# Patient Record
Sex: FEMALE | Race: WHITE | NOT HISPANIC OR LATINO | Employment: FULL TIME | URBAN - METROPOLITAN AREA
[De-identification: names, ages, dates, MRNs, and addresses within clinical notes are randomized per-mention and may not be internally consistent; named-entity substitution may affect disease eponyms.]

---

## 2024-11-25 ENCOUNTER — OFFICE VISIT (OUTPATIENT)
Dept: URGENT CARE | Facility: CLINIC | Age: 44
End: 2024-11-25
Payer: COMMERCIAL

## 2024-11-25 VITALS
HEART RATE: 80 BPM | OXYGEN SATURATION: 99 % | SYSTOLIC BLOOD PRESSURE: 122 MMHG | TEMPERATURE: 98.9 F | DIASTOLIC BLOOD PRESSURE: 82 MMHG

## 2024-11-25 DIAGNOSIS — L03.317 CELLULITIS OF BUTTOCK: Primary | ICD-10-CM

## 2024-11-25 PROCEDURE — 99203 OFFICE O/P NEW LOW 30 MIN: CPT | Performed by: NURSE PRACTITIONER

## 2024-11-25 RX ORDER — LACTULOSE 10 G/15ML
SOLUTION ORAL
COMMUNITY
Start: 2024-02-07

## 2024-11-25 RX ORDER — LAMOTRIGINE 100 MG/1
100 TABLET ORAL
COMMUNITY

## 2024-11-25 RX ORDER — SULFAMETHOXAZOLE AND TRIMETHOPRIM 800; 160 MG/1; MG/1
1 TABLET ORAL 2 TIMES DAILY
Qty: 20 TABLET | Refills: 0 | Status: SHIPPED | OUTPATIENT
Start: 2024-11-25 | End: 2024-12-05

## 2024-11-25 RX ORDER — GUAIFENESIN, PSEUDOEPHEDRINE HYDROCHLORIDE 600; 60 MG/1; MG/1
TABLET, EXTENDED RELEASE ORAL EVERY 12 HOURS
COMMUNITY
Start: 2022-10-25

## 2024-11-25 RX ORDER — LUBIPROSTONE 24 UG/1
24 CAPSULE ORAL 2 TIMES DAILY
COMMUNITY

## 2024-11-25 RX ORDER — AMMONIUM LACTATE 12 G/100G
CREAM TOPICAL
COMMUNITY
Start: 2024-01-08

## 2024-11-25 RX ORDER — METHYLPREDNISOLONE 4 MG/1
TABLET ORAL
COMMUNITY
Start: 2022-10-25

## 2024-11-25 RX ORDER — GABAPENTIN 400 MG/1
400 CAPSULE ORAL 3 TIMES DAILY
COMMUNITY

## 2024-11-25 RX ORDER — NAPROXEN 500 MG/1
TABLET ORAL 2 TIMES DAILY
COMMUNITY
Start: 2021-02-23

## 2024-11-25 RX ORDER — TRIAMCINOLONE ACETONIDE 1 MG/G
OINTMENT TOPICAL
COMMUNITY
Start: 2024-02-23

## 2024-11-25 RX ORDER — OSELTAMIVIR PHOSPHATE 75 MG/1
CAPSULE ORAL 2 TIMES DAILY
COMMUNITY
Start: 2022-10-25

## 2024-11-25 RX ORDER — PAROXETINE 10 MG/1
10 TABLET, FILM COATED ORAL
COMMUNITY

## 2024-11-25 RX ORDER — SODIUM FLUORIDE1.1%, POTASSIUM NITRATE 5% 5.8; 57.5 MG/ML; MG/ML
GEL, DENTIFRICE DENTAL
COMMUNITY
Start: 2024-10-01

## 2024-11-25 RX ORDER — BUPRENORPHINE AND NALOXONE 8; 2 MG/1; MG/1
FILM, SOLUBLE BUCCAL; SUBLINGUAL
COMMUNITY

## 2024-11-25 RX ORDER — KETOCONAZOLE 20 MG/G
CREAM TOPICAL DAILY
COMMUNITY
Start: 2023-11-27

## 2024-11-25 RX ORDER — VALACYCLOVIR HYDROCHLORIDE 1 G/1
1 TABLET, FILM COATED ORAL
COMMUNITY
Start: 2023-12-05

## 2024-11-25 RX ORDER — CELECOXIB 100 MG/1
100 CAPSULE ORAL EVERY 12 HOURS
COMMUNITY
Start: 2024-09-23

## 2024-11-25 NOTE — PROGRESS NOTES
Subjective   Patient ID: Jessica Álvarez is a 43 y.o. female.    Patient presents with rash to buttock region, rash on one side of body, reports a burning / tingling discomfort in area, clusters, denies discharge, Hx of shingles/MRSA.  She states that she was had a rash in the same similar area they thought it was shingles and did a swallow but then they put her in the hospital on IV antibiotic therapy.  He did improve.  The rapidly was a good diagnosis.  The rash that does not hurt in any way that is white pimple-like structure noted to the lower buttocks.  Denies any fever, chills, chest pain or shortness of breath.  No treatment prior to arrival      History provided by:  Patient  Rash        The following portions of the chart were reviewed this encounter and updated as appropriate:  Allergies  Meds  Problems  Med Hx  Surg Hx  Fam Hx         Review of Systems   Skin:  Positive for rash.   All other systems reviewed and are negative.    Objective   Physical Exam  Vitals and nursing note reviewed.   Constitutional:       General: She is not in acute distress.     Appearance: Normal appearance. She is not toxic-appearing.   HENT:      Head: Normocephalic.      Right Ear: External ear normal.      Left Ear: External ear normal.      Nose: Nose normal.      Mouth/Throat:      Mouth: Mucous membranes are moist.      Pharynx: No oropharyngeal exudate or posterior oropharyngeal erythema.   Eyes:      Extraocular Movements: Extraocular movements intact.   Cardiovascular:      Rate and Rhythm: Normal rate and regular rhythm.      Heart sounds: Normal heart sounds.   Pulmonary:      Effort: Pulmonary effort is normal.      Breath sounds: Normal breath sounds. No wheezing.   Musculoskeletal:         General: Normal range of motion.      Cervical back: Normal range of motion and neck supple.   Skin:     Capillary Refill: Capillary refill takes less than 2 seconds.      Comments: [employer] medical assistant has been most of  her in room at all times.  There was an area of erythema with 5 white pustule like structures noted to the right lower gluteal fold.  No pain, no drainage.  No lymphangitic streaking   Neurological:      General: No focal deficit present.      Mental Status: She is alert and oriented to person, place, and time.   Psychiatric:         Mood and Affect: Mood normal.         Behavior: Behavior normal.         Thought Content: Thought content normal.       Procedures    Assessment/Plan   Diagnoses and all orders for this visit:  Cellulitis of buttock  -     sulfamethoxazole-trimethoprim (Bactrim DS) 800-160 mg tablet; Take 1 tablet by mouth 2 times a day for 10 days.  Treat for MRSA cellulitis  I will admit the rash does look like shingles but multiple times she stated that she does not have any pain and had no pain on palpation.  I believe with the nature of the appearance of this being shingles would be very painful.  Patient will start antibiotic therapy there is no improvement she can call back to be placed on Valtrex  Patient agrees and all Questions were answered    Above plan of care was reviewed with the patient, all questions were answered, through shared decision making the patient agrees with this plan of care.    Red flags for strict return precaution have been reviewed with the patient and or patient tonidian, patient is alert, oriented and non-toxic appearing, has decision making capabilities and agrees with the plan of care through shared decision making at this time. Current diagnosis, any medication changes, lab or radiologic results have been reviewed with the patient at the time of visit. If symptoms do not improve, or worsen, patient is to follow up with PCP or report to the emergency room.   Patient is alert and oriented x3 vital signs stable nontoxic-appearing and has decision-making capabilities.    Please note that the majority this note was made with Dragon speaking software there may be  grammatical errors secondary to the speaking program.      Patient disposition: Home

## 2025-07-17 ENCOUNTER — OFFICE VISIT (OUTPATIENT)
Facility: CLINIC | Age: 45
End: 2025-07-17
Payer: COMMERCIAL

## 2025-07-17 VITALS
DIASTOLIC BLOOD PRESSURE: 86 MMHG | OXYGEN SATURATION: 96 % | SYSTOLIC BLOOD PRESSURE: 119 MMHG | RESPIRATION RATE: 16 BRPM | HEART RATE: 72 BPM | TEMPERATURE: 97.8 F | WEIGHT: 131 LBS | BODY MASS INDEX: 19.92 KG/M2

## 2025-07-17 DIAGNOSIS — H66.001 NON-RECURRENT ACUTE SUPPURATIVE OTITIS MEDIA OF RIGHT EAR WITHOUT SPONTANEOUS RUPTURE OF TYMPANIC MEMBRANE: Primary | ICD-10-CM

## 2025-07-17 DIAGNOSIS — T36.95XA ANTIBIOTIC-INDUCED YEAST INFECTION: ICD-10-CM

## 2025-07-17 DIAGNOSIS — J01.10 ACUTE NON-RECURRENT FRONTAL SINUSITIS: ICD-10-CM

## 2025-07-17 DIAGNOSIS — B37.9 ANTIBIOTIC-INDUCED YEAST INFECTION: ICD-10-CM

## 2025-07-17 PROCEDURE — 99213 OFFICE O/P EST LOW 20 MIN: CPT | Performed by: PHYSICIAN ASSISTANT

## 2025-07-17 RX ORDER — AMOXICILLIN AND CLAVULANATE POTASSIUM 875; 125 MG/1; MG/1
875 TABLET, FILM COATED ORAL 2 TIMES DAILY
Qty: 20 TABLET | Refills: 0 | Status: SHIPPED | OUTPATIENT
Start: 2025-07-17 | End: 2025-07-27

## 2025-07-17 RX ORDER — PAROXETINE 20 MG/1
TABLET, FILM COATED ORAL
COMMUNITY

## 2025-07-17 RX ORDER — OMEPRAZOLE 40 MG/1
40 CAPSULE, DELAYED RELEASE ORAL
COMMUNITY

## 2025-07-17 RX ORDER — METHYLPREDNISOLONE 4 MG/1
TABLET ORAL
Qty: 21 TABLET | Refills: 0 | Status: SHIPPED | OUTPATIENT
Start: 2025-07-17

## 2025-07-17 RX ORDER — GABAPENTIN 800 MG/1
1 TABLET ORAL
COMMUNITY
Start: 2025-06-26

## 2025-07-17 RX ORDER — FLUCONAZOLE 150 MG/1
TABLET ORAL
Qty: 2 TABLET | Refills: 0 | Status: SHIPPED | OUTPATIENT
Start: 2025-07-17

## 2025-07-17 RX ORDER — OXAPROZIN 600 MG/1
TABLET, FILM COATED ORAL DAILY
COMMUNITY

## 2025-07-17 RX ORDER — FLUTICASONE PROPIONATE 50 MCG
1 SPRAY, SUSPENSION (ML) NASAL
COMMUNITY
Start: 2025-03-17 | End: 2025-07-17 | Stop reason: WASHOUT

## 2025-07-17 RX ORDER — LAMOTRIGINE 150 MG/1
1 TABLET ORAL DAILY
COMMUNITY

## 2025-07-17 NOTE — PROGRESS NOTES
"Subjective   Patient ID: Jessica Álvarez is a 44 y.o. female who presents for sinus congestion and Cough.    HPI     Pt c/o sinus congestion and productive cough x 5 days. States that her chest \"sounds like it's full of something.\" Does have slight body aches. She is taking pseudoephedrine, Tylenol, and Mucinex with little improvement. States that coworkers are sick with similar Sx - Coworker told that she has PNA. She has gotten elevated temperatures of 99 F. Reports that her \"glands\" are swollen with mild sore throat. She has not taken any COVID tests at home. Denies: fever, chills, dizziness, CP, SOB, abdominal pain, N/V/D, rash, swelling, bruising, ear pain. Admits to some headaches.     Review of Systems   All other systems reviewed and are negative.      Objective   /86   Pulse 72   Temp 36.6 °C (97.8 °F)   Resp 16   Wt 59.4 kg (131 lb)   SpO2 96%   BMI 19.92 kg/m²     Physical Exam  Vitals reviewed.   Constitutional:       General: She is awake.      Appearance: Normal appearance. She is well-developed.   HENT:      Head: Normocephalic and atraumatic.      Right Ear: Ear canal normal. A middle ear effusion is present. Tympanic membrane is injected, erythematous and bulging.      Left Ear: Tympanic membrane and ear canal normal.      Nose: Rhinorrhea present. Rhinorrhea is purulent.      Right Turbinates: Swollen.      Left Turbinates: Swollen.      Right Sinus: Frontal sinus tenderness present.      Left Sinus: Frontal sinus tenderness present.      Mouth/Throat:      Lips: Pink.      Mouth: Mucous membranes are moist.      Pharynx: Postnasal drip present.     Cardiovascular:      Rate and Rhythm: Normal rate and regular rhythm.   Pulmonary:      Effort: Pulmonary effort is normal.      Breath sounds: Transmitted upper airway sounds present. Examination of the left-lower field reveals rales. Rales present. No wheezing or rhonchi.     Musculoskeletal:      Cervical back: Full passive range of " motion without pain.      Right lower leg: No edema.      Left lower leg: No edema.     Skin:     General: Skin is warm and dry.      Findings: No lesion or rash.     Neurological:      General: No focal deficit present.      Mental Status: She is alert and oriented to person, place, and time.      Cranial Nerves: No facial asymmetry.      Motor: Motor function is intact.      Gait: Gait is intact.     Psychiatric:         Attention and Perception: Attention normal.         Mood and Affect: Mood and affect normal.         Assessment/Plan   Problem List Items Addressed This Visit    None  Visit Diagnoses         Codes      Non-recurrent acute suppurative otitis media of right ear without spontaneous rupture of tympanic membrane    -  Primary H66.001    Relevant Medications    amoxicillin-clavulanate (Augmentin) 875-125 mg tablet      Antibiotic-induced yeast infection     B37.9, T36.95XA    Relevant Medications    fluconazole (Diflucan) 150 mg tablet      Acute non-recurrent frontal sinusitis     J01.10    Relevant Medications    methylPREDNISolone (Medrol Dospak) 4 mg tablets          Right ear infection likely secondary to sinus infection, as well as crackles in the left lower lobe of the lung indicating a possible PNA  Augmentin and medrol Rx   Can continue with Mucinex and Sudafed  Make sure to drink plenty of water to thin the mucous   Get plenty of rest    Red flag symptoms reviewed with patient and all questions answered to patient or guardian's satisfaction. Patient or guardian verbalized understanding and agreement with care plan as above. All in office testing reviewed with patient/guardian. If symptoms worsen or do not improve, patient is to follow up with PCP or report to the ER.

## 2025-07-17 NOTE — PATIENT INSTRUCTIONS
Right ear infection likely secondary to sinus infection, as well as crackles in the left lower lobe of the lung indicating a possible PNA  Augmentin and medrol Rx   Can continue with Mucinex and Sudafed  Make sure to drink plenty of water to thin the mucous   Get plenty of rest